# Patient Record
Sex: FEMALE | Race: WHITE | URBAN - METROPOLITAN AREA
[De-identification: names, ages, dates, MRNs, and addresses within clinical notes are randomized per-mention and may not be internally consistent; named-entity substitution may affect disease eponyms.]

---

## 2017-12-04 NOTE — TELEPHONE ENCOUNTER
APPT INFO    Date /Time: 12/18/17- 9:45 AM    Reason for Appt: Left hip - 6 yr f/u    Ref Provider/Clinic: Self   Are there internal records? If yes, list: [facility] - Dr. Gonzáles  Clinic will need to request paper chart for additional records if needed.      Patient Contact (Y/N) & Call Details: No - Last seen by Dr. Gonzáles in 2012.  Op note is in Epic- 7/8/11    Action: Closing encounter.

## 2017-12-10 ASSESSMENT — ENCOUNTER SYMPTOMS
STIFFNESS: 1
MUSCLE CRAMPS: 0
TASTE DISTURBANCE: 0
HOARSE VOICE: 0
SORE THROAT: 0
SINUS CONGESTION: 0
BACK PAIN: 1
MUSCLE WEAKNESS: 0
MYALGIAS: 1
NECK PAIN: 1
TROUBLE SWALLOWING: 0
JOINT SWELLING: 0
NECK MASS: 0
SMELL DISTURBANCE: 0
ARTHRALGIAS: 1
SINUS PAIN: 0

## 2017-12-14 DIAGNOSIS — Z96.649 S/P HIP REPLACEMENT: Primary | ICD-10-CM

## 2017-12-16 ASSESSMENT — ACTIVITIES OF DAILY LIVING (ADL)
ADL_SUBSCALE_SCORE: 100
ADL_MEAN: 0
ADL_SUM: 0

## 2017-12-16 ASSESSMENT — HOOS S4: HOW SEVERE IS YOUR HIP JOINT STIFFNESS AFTER FIRST WAKENING IN THE MORNING?: MILD

## 2017-12-18 ENCOUNTER — RADIANT APPOINTMENT (OUTPATIENT)
Dept: GENERAL RADIOLOGY | Facility: CLINIC | Age: 72
End: 2017-12-18
Attending: ORTHOPAEDIC SURGERY
Payer: MEDICARE

## 2017-12-18 ENCOUNTER — OFFICE VISIT (OUTPATIENT)
Dept: ORTHOPEDICS | Facility: CLINIC | Age: 72
End: 2017-12-18
Payer: MEDICARE

## 2017-12-18 ENCOUNTER — PRE VISIT (OUTPATIENT)
Dept: ORTHOPEDICS | Facility: CLINIC | Age: 72
End: 2017-12-18

## 2017-12-18 VITALS — HEIGHT: 65 IN | BODY MASS INDEX: 25.36 KG/M2 | WEIGHT: 152.2 LBS

## 2017-12-18 DIAGNOSIS — Z96.649 S/P HIP REPLACEMENT: ICD-10-CM

## 2017-12-18 DIAGNOSIS — Z96.642 S/P HIP REPLACEMENT, LEFT: Primary | ICD-10-CM

## 2017-12-18 RX ORDER — SODIUM PHOSPHATE,MONO-DIBASIC 19G-7G/118
2 ENEMA (ML) RECTAL DAILY
COMMUNITY

## 2017-12-18 NOTE — PROGRESS NOTES
"    [facility]  Orthopaedic Surgery, Joint Replacement Consultation  by Eduard Gonzáles M.D.    Hellen Mayfield MRN# [id number]   Age: 72 year old YOB: 1945     Requesting physician: Referred Self  Beatriz Mast            Assessment and Plan:   Assessment:  L GAUTAM 2011, doing well 6 years out.     Plan:  Continue all activities as tolerated.  No restrictions.    RTC 5 years for routine f/u, AP pelvis/L lateral hip XR at that time.           History of Present Illness:   72 year old female  chief complaint      Current symptoms:  none    Background history:  DX:  1. L hip DJD    TREATMENTS:  1. 7/8/11, L primary GAUTAM, JOSÉ Gonzáles.           Physical Exam:     EXAMINATION pertinent findings:   VITAL SIGNS: Height 1.638 m (5' 4.5\"), weight 69 kg (152 lb 3.2 oz).  Body mass index is 25.72 kg/(m^2).  RESP: non labored breathing   ABD: benign   SKIN: grossly normal   LYMPHATIC: grossly normal   NEURO: grossly normal   VASCULAR: satisfactory perfusion of all extremities   MUSCULOSKELETAL:   LLE:   No obvious deformity, skin intact.  Incision completely benign.   L hip ROM 0-120   Abd 50   IR/ER 50/50.   SILT dp/sp/t/s/s nerve distributions   DP 2+   Fires EHL/FHL/TA/Gsc 5/5 strength   No calf tenderness         Data:   All laboratory data reviewed  All imaging studies reviewed by me       MD Delia Shetty Family Professor  Oncology and Adult Reconstructive Surgery  Dept Orthopaedic Surgery, [facility]  [phone] office, [phone] pager  [email or web address]            DATA for DOCUMENTATION:         Past Medical History:     Patient Active Problem List   Diagnosis     S/P hip replacement     Past Medical History:   Diagnosis Date     Cancer (H)     breast and cervical     Degenerative joint disease 2005       Also see scanned health assessment forms.       Past Surgical History:     Past Surgical History:   Procedure Laterality Date     APPENDECTOMY       ARTHROPLASTY HIP  " 2011    Procedure:ARTHROPLASTY HIP; Uncemented ; Surgeon:HAYES MANZO; [facility]     BACK SURGERY      Cervical fusion      C PELVIS/HIP JOINT SURGERY UNLISTED  2010     C STOMACH SURGERY PROCEDURE UNLISTED      Appendectomy     COLONOSCOPY       HYSTERECTOMY       LUMPECTOMY BREAST       NECK SURGERY              Social History:     Social History     Social History     Marital status: Unknown     Spouse name: N/A     Number of children: N/A     Years of education: N/A     Occupational History     Not on file.     Social History Main Topics     Smoking status: Former Smoker     Packs/day: 0.50     Years: 1.00     Types: Cigarettes     Start date: 1965     Quit date: 1966     Smokeless tobacco: Never Used     Alcohol use Yes      Comment: 1 glass of red wine 3-4 days a week     Drug use: No     Sexual activity: No     Other Topics Concern     Not on file     Social History Narrative            Family History:       Family History   Problem Relation Age of Onset     CEREBROVASCULAR DISEASE Mother      At age 85     Breast Cancer Sister       of matasticized cancer in      Prostate Cancer Brother      Cardiac Sudden Death Father      While 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     raloxifene (EVISTA) 60 MG tablet Take  by mouth daily.     fish oil-omega-3 fatty acids (FISH OIL) 1000 MG capsule Take  by mouth 2 times daily.     Loratadine 10 MG capsule Take 10 mg by mouth daily.     Multiple Vitamin (MULTIVITAMIN OR) Take  by mouth daily.      mometasone (NASONEX) 50 MCG/ACT nasal spray 2 sprays by Both Nostrils route daily.     polyethylene glycol - propylene glycol (SYSTANE) 0.4-0.3 % SOLN Place 1 drop into both eyes daily.     Cholecalciferol (VITAMIN D) 1000 UNIT capsule Take 1 capsule by mouth daily.     No current facility-administered medications for this visit.               Review of Systems:   A comprehensive 10 point review of systems (constitutional, ENT, cardiac, peripheral vascular, lymphatic, respiratory, GI, , Musculoskeletal, skin, Neurological) was performed and found to be negative except as described in this note.     See intake form completed by patient    Answers for HPI/ROS submitted by the patient on 12/10/2017   General Symptoms: No  Skin Symptoms: No  HENT Symptoms: Yes  EYE SYMPTOMS: No  HEART SYMPTOMS: No  LUNG SYMPTOMS: No  INTESTINAL SYMPTOMS: No  URINARY SYMPTOMS: No  GYNECOLOGIC SYMPTOMS: No  BREAST SYMPTOMS: No  SKELETAL SYMPTOMS: Yes  BLOOD SYMPTOMS: No  NERVOUS SYSTEM SYMPTOMS: No  MENTAL HEALTH SYMPTOMS: No  Ear pain: No  Ear discharge: No  Hearing loss: No  Tinnitus: No  Nosebleeds: No  Congestion: No  Sinus pain: No  Trouble swallowing: No   Voice hoarseness: No  Mouth sores: No  Sore throat: No  Tooth pain: Yes  Gum tenderness: Yes  Bleeding gums: No  Change in taste: No  Change in sense of smell: No  Dry mouth: No  Hearing aid used: No  Neck lump: No  Back pain: Yes  Muscle aches: Yes  Neck pain: Yes  Swollen joints: No  Joint pain: Yes  Bone pain: No  Muscle cramps: No  Muscle weakness: No  Joint stiffness: Yes  Bone fracture: No    Attending MD (Dr. Eduard Gonzáles) :  This patient was seen and evaluated by me including a history, exam, and interpretation of all imaging and/or lab data.  A training physician (resident/fellow), who also saw the patient, has documented the clinic visit in the attached note using voice recognition dictation software, as such, transcription errors may be present.    Eduard ABRAMS  MD Delia Gonzáles Family Professor  Oncology and Adult Reconstructive Surgery  Dept Orthopaedic Surgery, [facility]  [phone] office, [phone] pager  [email or web address]    [email or web address]

## 2017-12-18 NOTE — MR AVS SNAPSHOT
"              After Visit Summary   12/18/2017    Hellen Mayfield    MRN: [id number]           Patient Information     Date Of Birth          1945        Visit Information        Provider Department      12/18/2017 9:45 AM Eduard Gonzáles MD [facility]        Today's Diagnoses     S/P hip replacement, left    -  1       Follow-ups after your visit        Who to contact     Please call your clinic at [phone] to:    Ask questions about your health    Make or cancel appointments    Discuss your medicines    Learn about your test results    Speak to your doctor   If you have compliments or concerns about an experience at your clinic, or if you wish to file a complaint, please contact [facility] Patient Relations at [phone] or email us at [email or web address]         Additional Information About Your Visit        MyChart Information     [employer] gives you secure access to your electronic health record. If you see a primary care provider, you can also send messages to your care team and make appointments. If you have questions, please call your primary care clinic.  If you do not have a primary care provider, please call [phone] and they will assist you.      Springpad is an electronic gateway that provides easy, online access to your medical records. With Springpad, you can request a clinic appointment, read your test results, renew a prescription or communicate with your care team.     To access your existing account, please contact your [facility] or call [phone] for assistance.        Care EveryWhere ID     This is your Care EveryWhere ID. This could be used by other organizations to access your [facility] medical records  [id number]        Your Vitals Were     Height BMI (Body Mass Index)                1.638 m (5' 4.5\") 25.72 kg/m2           Blood Pressure from Last 3 Encounters:   08/13/12 124/62   07/12/11 " 109/65    Weight from Last 3 Encounters:   12/18/17 69 kg (152 lb 3.2 oz)   08/13/12 73.6 kg (162 lb 3.2 oz)   08/08/11 70.3 kg (155 lb)              Today, you had the following     No orders found for display       Primary Care Provider Office Phone # Fax #    Beatriz Mast [phone]       [facility] MEDICAL [address]  [facility] [address]        Equal Access to Services     ROMAN DAVIS : Hadii aad ku hadasho Soomaali, waaxda luqadaha, qaybta kaalmada adeegyada, waxay kanin hayalfredn mal champion. So [facility] [phone].    ATENCIÓN: Si habla español, tiene a schilling disposición servicios gratuitos de asistencia lingüística. [facility] [phone].    We comply with applicable federal civil rights laws and [place] laws. We do not discriminate on the basis of race, color, national origin, age, disability, sex, sexual orientation, or gender identity.            Thank you!     Thank you for choosing [facility]  for your care. Our goal is always to provide you with excellent care. Hearing back from our patients is one way we can continue to improve our services. Please take a few minutes to complete the written survey that you may receive in the mail after your visit with us. Thank you!             Your Updated Medication List - Protect others around you: Learn how to safely use, store and throw away your medicines at www.disposemymeds.org.          This list is accurate as of: 12/18/17  5:56 PM.  Always use your most recent med list.                   Brand Name Dispense Instructions for use Diagnosis    UYMIKO JON PO      Take 4 tablets by mouth 4 times daily        calcium 600 + D 600-400 MG-UNIT per tablet   Generic drug:  calcium-vitamin D      Take 1 tablet by mouth 3 times daily.        fish oil-omega-3 fatty acids 1000 MG capsule      Take  by mouth 2 times daily.        glucosamine-chondroitin 500-400 MG Caps per capsule      Take 2 capsules by mouth  daily        loratadine 10 MG capsule      Take 10 mg by mouth daily.        MELATONIN PO      Take 10 mg by mouth nightly as needed        MULTIVITAMIN PO      Take  by mouth daily.        NASONEX 50 MCG/ACT spray   Generic drug:  mometasone      2 sprays by Both Nostrils route daily.        polyethylene glycol 0.4%- propylene glycol 0.3% 0.4-0.3 % Soln ophthalmic solution    SYSTANE ULTRA     Place 1 drop into both eyes daily.        raloxifene 60 MG tablet    Evista     Take  by mouth daily.        TURMERIC PO           vitamin D 1000 UNITS capsule      Take 1 capsule by mouth daily.

## 2017-12-18 NOTE — LETTER
"12/18/2017       RE: Hellen Mayfield  [address]    [place] [address]     Dear Colleague,    Thank you for referring your patient, Hellen Mayfield, to the [facility]THOP[facility] at [facility]. Please see a copy of my visit note below.      [facility]  Orthopaedic Surgery, Joint Replacement Consultation  by Eduard Gonzáles M.D.    Hellen Mayfield MRN# [id number]   Age: 72 year old YOB: 1945     Requesting physician: Referred Self  Beatriz Mast            Assessment and Plan:   Assessment:  L GAUTAM 2011, doing well 6 years out.     Plan:  Continue all activities as tolerated.  No restrictions.    RTC 5 years for routine f/u, AP pelvis/L lateral hip XR at that time.           History of Present Illness:   72 year old female  chief complaint      Current symptoms:  none    Background history:  DX:  1. L hip DJD    TREATMENTS:  1. 7/8/11, L primary GAUTAM, JOSÉ Gonzáles.           Physical Exam:     EXAMINATION pertinent findings:   VITAL SIGNS: Height 1.638 m (5' 4.5\"), weight 69 kg (152 lb 3.2 oz).  Body mass index is 25.72 kg/(m^2).  RESP: non labored breathing   ABD: benign   SKIN: grossly normal   LYMPHATIC: grossly normal   NEURO: grossly normal   VASCULAR: satisfactory perfusion of all extremities   MUSCULOSKELETAL:   LLE:   No obvious deformity, skin intact.  Incision completely benign.   L hip ROM 0-120   Abd 50   IR/ER 50/50.   SILT dp/sp/t/s/s nerve distributions   DP 2+   Fires EHL/FHL/TA/Gsc 5/5 strength   No calf tenderness         Data:   All laboratory data reviewed  All imaging studies reviewed by me       MD Delia Shetty Family Professor  Oncology and Adult Reconstructive Surgery  Dept Orthopaedic Surgery, [facility]  [phone] office, [phone] pager  [email or web address]            DATA for DOCUMENTATION:         Past Medical History:     Patient Active Problem List   Diagnosis     S/P hip " replacement     Past Medical History:   Diagnosis Date     Cancer (H)     breast and cervical     Degenerative joint disease        Also see scanned health assessment forms.       Past Surgical History:     Past Surgical History:   Procedure Laterality Date     APPENDECTOMY       ARTHROPLASTY HIP  2011    Procedure:ARTHROPLASTY HIP; Uncemented ; Surgeon:HAYES MANZO; Location[facility]     BACK SURGERY      Cervical fusion      C PELVIS/HIP JOINT SURGERY UNLISTED  2010     C STOMACH SURGERY PROCEDURE UNLISTED      Appendectomy     COLONOSCOPY       HYSTERECTOMY       LUMPECTOMY BREAST       NECK SURGERY              Social History:     Social History     Social History     Marital status: Unknown     Spouse name: N/A     Number of children: N/A     Years of education: N/A     Occupational History     Not on file.     Social History Main Topics     Smoking status: Former Smoker     Packs/day: 0.50     Years: 1.00     Types: Cigarettes     Start date: 1965     Quit date: 1966     Smokeless tobacco: Never Used     Alcohol use Yes      Comment: 1 glass of red wine 3-4 days a week     Drug use: No     Sexual activity: No     Other Topics Concern     Not on file     Social History Narrative            Family History:       Family History   Problem Relation Age of Onset     CEREBROVASCULAR DISEASE Mother      At age 85     Breast Cancer Sister       of matasticized cancer in      Prostate Cancer Brother      Cardiac Sudden Death Father      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     raloxifene (EVISTA) 60 MG tablet Take  by mouth daily.     fish oil-omega-3 fatty acids (FISH OIL) 1000 MG capsule Take  by mouth 2 times daily.     Loratadine 10 MG capsule Take 10 mg by mouth daily.     Multiple Vitamin (MULTIVITAMIN OR) Take  by mouth daily.     mometasone (NASONEX) 50 MCG/ACT nasal spray 2 sprays by Both Nostrils route daily.     polyethylene glycol - propylene glycol (SYSTANE) 0.4-0.3 % SOLN Place 1 drop into both eyes daily.     Cholecalciferol (VITAMIN D) 1000 UNIT capsule Take 1 capsule by mouth daily.     No current facility-administered medications for this visit.               Review of Systems:   A comprehensive 10 point review of systems (constitutional, ENT, cardiac, peripheral vascular, lymphatic, respiratory, GI, , Musculoskeletal, skin, Neurological) was performed and found to be negative except as described in this note.     See intake form completed by patient

## 2020-03-01 ENCOUNTER — HEALTH MAINTENANCE LETTER (OUTPATIENT)
Age: 75
End: 2020-03-01

## 2020-12-14 ENCOUNTER — HEALTH MAINTENANCE LETTER (OUTPATIENT)
Age: 75
End: 2020-12-14

## 2021-04-18 ENCOUNTER — HEALTH MAINTENANCE LETTER (OUTPATIENT)
Age: 76
End: 2021-04-18

## 2021-10-02 ENCOUNTER — HEALTH MAINTENANCE LETTER (OUTPATIENT)
Age: 76
End: 2021-10-02

## 2022-03-19 ENCOUNTER — HEALTH MAINTENANCE LETTER (OUTPATIENT)
Age: 77
End: 2022-03-19

## 2022-05-14 ENCOUNTER — HEALTH MAINTENANCE LETTER (OUTPATIENT)
Age: 77
End: 2022-05-14

## 2022-09-03 ENCOUNTER — HEALTH MAINTENANCE LETTER (OUTPATIENT)
Age: 77
End: 2022-09-03

## 2023-06-03 ENCOUNTER — HEALTH MAINTENANCE LETTER (OUTPATIENT)
Age: 78
End: 2023-06-03